# Patient Record
Sex: FEMALE | Race: WHITE
[De-identification: names, ages, dates, MRNs, and addresses within clinical notes are randomized per-mention and may not be internally consistent; named-entity substitution may affect disease eponyms.]

---

## 2018-09-03 ENCOUNTER — HOSPITAL ENCOUNTER (EMERGENCY)
Dept: HOSPITAL 95 - ER | Age: 53
LOS: 1 days | Discharge: HOME | End: 2018-09-04
Payer: MEDICAID

## 2018-09-03 VITALS — BODY MASS INDEX: 41.77 KG/M2 | HEIGHT: 62 IN | WEIGHT: 227.01 LBS

## 2018-09-03 DIAGNOSIS — S92.512A: Primary | ICD-10-CM

## 2018-09-03 DIAGNOSIS — Z87.891: ICD-10-CM
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
## 2019-06-03 ENCOUNTER — HOSPITAL ENCOUNTER (EMERGENCY)
Dept: HOSPITAL 95 - ER | Age: 54
LOS: 1 days | Discharge: HOME | End: 2019-06-04
Payer: SELF-PAY

## 2019-06-03 VITALS — BODY MASS INDEX: 41.59 KG/M2 | HEIGHT: 62 IN | WEIGHT: 226 LBS

## 2019-06-03 DIAGNOSIS — J45.909: ICD-10-CM

## 2019-06-03 DIAGNOSIS — R42: Primary | ICD-10-CM

## 2019-06-03 DIAGNOSIS — Z88.1: ICD-10-CM

## 2019-06-03 DIAGNOSIS — Z87.891: ICD-10-CM

## 2019-06-03 LAB
ALBUMIN SERPL BCP-MCNC: 4.1 G/DL (ref 3.4–5)
ALBUMIN/GLOB SERPL: 1.1 {RATIO} (ref 0.8–1.8)
ALT SERPL W P-5'-P-CCNC: 29 U/L (ref 12–78)
ANION GAP SERPL CALCULATED.4IONS-SCNC: 7 MMOL/L (ref 6–16)
AST SERPL W P-5'-P-CCNC: 14 U/L (ref 12–37)
BASOPHILS # BLD AUTO: 0.03 K/MM3 (ref 0–0.23)
BASOPHILS NFR BLD AUTO: 1 % (ref 0–2)
BILIRUB SERPL-MCNC: 0.2 MG/DL (ref 0.1–1)
BUN SERPL-MCNC: 21 MG/DL (ref 8–24)
CALCIUM SERPL-MCNC: 8.9 MG/DL (ref 8.5–10.1)
CHLORIDE SERPL-SCNC: 108 MMOL/L (ref 98–108)
CO2 SERPL-SCNC: 26 MMOL/L (ref 21–32)
CREAT SERPL-MCNC: 0.58 MG/DL (ref 0.4–1)
DEPRECATED RDW RBC AUTO: 44.8 FL (ref 35.1–46.3)
EOSINOPHIL # BLD AUTO: 0.18 K/MM3 (ref 0–0.68)
EOSINOPHIL NFR BLD AUTO: 3 % (ref 0–6)
ERYTHROCYTE [DISTWIDTH] IN BLOOD BY AUTOMATED COUNT: 13.5 % (ref 11.7–14.2)
GLOBULIN SER CALC-MCNC: 3.7 G/DL (ref 2.2–4)
GLUCOSE SERPL-MCNC: 114 MG/DL (ref 70–99)
HCT VFR BLD AUTO: 40.2 % (ref 33–51)
HGB BLD-MCNC: 13.1 G/DL (ref 11.5–16)
IMM GRANULOCYTES # BLD AUTO: 0.01 K/MM3 (ref 0–0.1)
IMM GRANULOCYTES NFR BLD AUTO: 0 % (ref 0–1)
LYMPHOCYTES # BLD AUTO: 2.55 K/MM3 (ref 0.84–5.2)
LYMPHOCYTES NFR BLD AUTO: 42 % (ref 21–46)
MCHC RBC AUTO-ENTMCNC: 32.6 G/DL (ref 31.5–36.5)
MCV RBC AUTO: 90 FL (ref 80–100)
MONOCYTES # BLD AUTO: 0.33 K/MM3 (ref 0.16–1.47)
MONOCYTES NFR BLD AUTO: 5 % (ref 4–13)
NEUTROPHILS # BLD AUTO: 3.02 K/MM3 (ref 1.96–9.15)
NEUTROPHILS NFR BLD AUTO: 49 % (ref 41–73)
NRBC # BLD AUTO: 0 K/MM3 (ref 0–0.02)
NRBC BLD AUTO-RTO: 0 /100 WBC (ref 0–0.2)
PLATELET # BLD AUTO: 266 K/MM3 (ref 150–400)
POTASSIUM SERPL-SCNC: 4.1 MMOL/L (ref 3.5–5.5)
PROT SERPL-MCNC: 7.8 G/DL (ref 6.4–8.2)
SODIUM SERPL-SCNC: 141 MMOL/L (ref 136–145)

## 2020-11-06 ENCOUNTER — HOSPITAL ENCOUNTER (OUTPATIENT)
Dept: HOSPITAL 95 - LAB SHORT | Age: 55
End: 2020-11-06
Attending: PHYSICIAN ASSISTANT
Payer: SELF-PAY

## 2020-11-06 DIAGNOSIS — R00.2: Primary | ICD-10-CM

## 2020-11-06 LAB
ALBUMIN SERPL BCP-MCNC: 4.1 G/DL (ref 3.4–5)
ALBUMIN/GLOB SERPL: 1.1 {RATIO} (ref 0.8–1.8)
ALT SERPL W P-5'-P-CCNC: 29 U/L (ref 12–78)
ANION GAP SERPL CALCULATED.4IONS-SCNC: 9 MMOL/L (ref 6–16)
AST SERPL W P-5'-P-CCNC: 12 U/L (ref 12–37)
BASOPHILS # BLD AUTO: 0.03 K/MM3 (ref 0–0.23)
BASOPHILS NFR BLD AUTO: 1 % (ref 0–2)
BILIRUB SERPL-MCNC: 0.1 MG/DL (ref 0.1–1)
BUN SERPL-MCNC: 19 MG/DL (ref 8–24)
CALCIUM SERPL-MCNC: 9.1 MG/DL (ref 8.5–10.1)
CHLORIDE SERPL-SCNC: 104 MMOL/L (ref 98–108)
CO2 SERPL-SCNC: 27 MMOL/L (ref 21–32)
CREAT SERPL-MCNC: 0.59 MG/DL (ref 0.4–1)
DEPRECATED RDW RBC AUTO: 43.7 FL (ref 35.1–46.3)
EOSINOPHIL # BLD AUTO: 0.18 K/MM3 (ref 0–0.68)
EOSINOPHIL NFR BLD AUTO: 3 % (ref 0–6)
ERYTHROCYTE [DISTWIDTH] IN BLOOD BY AUTOMATED COUNT: 13.5 % (ref 11.7–14.2)
GLOBULIN SER CALC-MCNC: 3.7 G/DL (ref 2.2–4)
GLUCOSE SERPL-MCNC: 114 MG/DL (ref 70–99)
HCT VFR BLD AUTO: 39 % (ref 33–51)
HGB BLD-MCNC: 13.1 G/DL (ref 11.5–16)
IMM GRANULOCYTES # BLD AUTO: 0.01 K/MM3 (ref 0–0.1)
IMM GRANULOCYTES NFR BLD AUTO: 0 % (ref 0–1)
LYMPHOCYTES # BLD AUTO: 2.53 K/MM3 (ref 0.84–5.2)
LYMPHOCYTES NFR BLD AUTO: 47 % (ref 21–46)
MCHC RBC AUTO-ENTMCNC: 33.6 G/DL (ref 31.5–36.5)
MCV RBC AUTO: 88 FL (ref 80–100)
MONOCYTES # BLD AUTO: 0.28 K/MM3 (ref 0.16–1.47)
MONOCYTES NFR BLD AUTO: 5 % (ref 4–13)
NEUTROPHILS # BLD AUTO: 2.4 K/MM3 (ref 1.96–9.15)
NEUTROPHILS NFR BLD AUTO: 44 % (ref 41–73)
NRBC # BLD AUTO: 0 K/MM3 (ref 0–0.02)
NRBC BLD AUTO-RTO: 0 /100 WBC (ref 0–0.2)
PLATELET # BLD AUTO: 248 K/MM3 (ref 150–400)
POTASSIUM SERPL-SCNC: 3.7 MMOL/L (ref 3.5–5.5)
PROT SERPL-MCNC: 7.8 G/DL (ref 6.4–8.2)
SODIUM SERPL-SCNC: 140 MMOL/L (ref 136–145)
TROPONIN I SERPL-MCNC: <0.017 NG/ML (ref 0–0.04)

## 2021-04-07 NOTE — NUR
Assumed care of pt at 0900 from Emperatriz BROWN. Pt A&O x 4. Answers questions.
Follows commands. Verbalizes needs. Pleasant and cooperative with care. Pt out
of bed to use bedside commode, weak, but otherwise tolerates well. Requires
one person assistance to use commode. SpO2 remains 90% or greater with rest
and mobility. Pt currently on 4 LPM NC. Does not wear O2 at home. SR per
monitor. BP stable. Bed in lowest position. Call light in reach. Pt denies
need at this time.

## 2021-04-07 NOTE — NUR
SUMMARY
 
Pt's oxygen per nasal cannula has successfully been titrated down from 4 LPM
to 3 LPM. Sp02 is currently 94%. Pt had two liquid bowel movements this shift.
Up to bedside commode for each void of urine or stool. Pt reported "lung pain"
this afternoon, worse with coughing. Fentanyl given. Pt then reported nausea
shortly after fentanyl provided. Zofran given. No vomiting noted. It was at
this time that OT came by to see pt. Plan to skip treatment today. Bed in
lowest position. Call light in reach. Will continue to closely monitor until
care handoff and bedside report with oncoming RN.

## 2021-04-07 NOTE — NUR
UPDATE
 
No acute changes. Pt currently in bed, sleeping, at this time. Pt OOB several
times to use commode. Continues to tolerate activity without drop in SpO2,
however generally weak and deconditioned.

## 2021-04-07 NOTE — NUR
SHIFT SUMMARY
PT IS ADMITTED TO ICU FOR PNE R/T COVID. PT'S DAUGHTER IS ALSO SICK AND TESTED
POSITIVE, BUT SHE STATES THAT HER SPOUSE HAS BEEN AWAY FROM HOME FOR WORK AND
HAS NOT BEEN EXPOSED. PT IS COYNE WHEN TRANSFERRING TO BEDSIDE COMMODE. SPO2 92%
OR GREATER MAINTAINED WITH O2 BY NC AT 4LPM. SHE ALSO REPORTS PAIN IN HER
LUNGS WHEN COUGHING. SHE IS DROWSY AND FALLS ASLEEP EASILY WHEN UNDISTURBED.
WILL CONTINUE TO MONITOR AND REPORT TO ONCOMING SHIFT.

## 2021-04-07 NOTE — NUR
CARE ASSUMED
PT AWAKE, ALEART TO VOISE, FOLLOWS. ON 4 L N/C, VITALS STABLE. PIV: S.L.
CONTINUE ASSESSMENT AND CARE.

## 2021-04-07 NOTE — NUR
DECREASED SAT
UP TO BSC ON 3 L N/C, PT HAS INCREASED SOB, RR 30, INCREASEING COUGHING WITH
ACTIVITY. INCREASED 02 TO 4 L N/C TO RECOVER BACK IN BED. INCREASE O2 DEMAND,
SOB COUGHING AND LUNG PAIN WITH ANY ACTIVITY.

## 2021-04-07 NOTE — NUR
ADMIT:4/6/21 DISCHARGE: DX: COVID-19 CC: [facility]
TIFFANIE CALL:
RESIDENCE: Home with spouse
CAREGIVER:
Jamie Garber, Spouse / Partner, [phone]
Daughter Windy [phone]
DX: COVID-19, vertigo
DME: none
CCM: none
HOME HEALTH: none
SUMMARY: Admit: 4/6/21
4/7/21- per chart review with Dr. Pena, pt's status has been changed from
ICU to PCU. She will not be d/c home at this time. Pt has been put on droplet
precautions. Currently on 4L O2, noted COYNE with assisted movements with
toileting, ect. PT and OT have been ordered for pt but have not been in to
work with pt at this time. -good

## 2021-04-08 NOTE — NUR
SUMMARY
 
No acute changes t/o shift. Pt OOB for majority of shift, sitting in chair. Pt
uses commode every two hours, or more frequently. Remains at 4 LPM NC. Rarely
desaturates with activity. Tolerated PT/OT well. Pt stated she has rib pain
with activity but she did not want to take pain medication. Educated that pain
meds have been changes since fentanyl made her sick. SR per monitor. BP
stable. Will continue to closely monitor until care handoff and bedside report
with oncoming RN.

## 2021-04-08 NOTE — NUR
Assumed care of pt at 0700 from Dilma BROWN. Pt A&O x 4. Answers questions.
Follows commands. Verbalizes needs. Pleasant and cooperative with care but
overall has flat affect and seems withdrawn. This AM, pt states that she needs
to have a bowel movement. Pt placed on bedpan, but after approx 5 minutes, pt
states she does not think she can have a bowel movement in a bedpan. Pt
assisted to use bedside commode. Weak, but otherwise tolerated well. Bedbath
provided while pt on commode. Pt then transferred to chair, agreeable to
sitting up for a while. No drop in SpO2 noted with activity. Pt on 4 LPM NC.
Changed to high flow and humidified NC by Sakina MEDLEY. SR per monitor. BP
stable. Pt currently in recliner with call light in reach. Pt looking forward
to working with physical therapy.

## 2021-04-08 NOTE — NUR
ASSEAAMENT
PT UP TO BSC, WITH INCREASED SOB, SATS 86%
REQUIRING 8-10 L N/C WHEN UP TO BSC. BACK TO
BED, 02 RETUREND TO 4 L N/C. SAT 95%. PAIN MEDS GIVEN PER MAR FOR ONGOING
HEADACHES.

## 2021-04-08 NOTE — NUR
ASSUMED CARE NOTE:
 
ASSUMED CARE OF PT AT 1900, RECEVIED REPORT FROM GERARDO BROWN. PT IS ALERT AND
ORIENTEDX3, ABLE TO COMMUNICATE NEEDS. PT ON 4L OF O2 VIA HUMITIFED HIGH FLOW,
SPO2 ABOVE 90% NO RESP DISTRESS NOTED AT THIS TIME. PT IS NSR WITH HR IN THE
70'S, BP STABLE. PT IS EXPERIENCING SEVERE PRURITUS AFTER DELIVERY OF TORDOL.
PT SKIN WAS WASHED, AND LOTION WAS APPLIED, COOL SHEET PROVIDED. BENYDRAL
ORDERED. NO RASH PRESENT, HOWEVER PT HAS BEEN SCRATCHING AND ABDOMEN IS
BECOMING RED, PT WAS REMINDED TO AVOID SCRATCHING IF POSSIBLE.

## 2021-04-08 NOTE — NUR
ONGOING ASSESSMENT
UP TO BSC, VERY SOB, UNABLE TO WIPE HERSELF, FEELING INCREASING WEAKNESS AND
REQUIRING INCREASED O2 TO GET UP TO BSC. DESATS 85% WITH ACTIVITY. CONSIDER
CHANGING PT TO BEDREST. CONTINUE ASSESSMENT AND CARE TILL REPORT OFF TO
ONCOMING SHIFT RN.

## 2021-04-08 NOTE — NUR
4/8/21- Per chart review, pt worked with PT today, she has a drop in O2
saturation with ambulation but it returns with rest. Pt's baseline is that she
is independent. Pt lives with family,  and 3 kids. She does provide
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 Assessment shows that pt has 14 steps to
access her bedroom but there are railings. PT's recommendation is for home
with home health and [facility] along with part-time caregiver support. Dr. Pena
has no d/c plan at this time. -jenellew

## 2021-04-08 NOTE — NUR
Patient is sitting on a chair and alert. Patient tells me about her virus
experience and how it has gone through most of her family. Patient has 6 kids.
Patient then talks at length about her spiritual journey. I normalize
patient's experience and provide recitation of scripture, pastoral  and
prayer. Patient responds well and shows signs of being encouraged in her
kurtis. I will continue to remain available to patient and family.

## 2021-04-09 NOTE — NUR
Upon patient's request to be served communion, I visit patient and bring the
communion elements. I perform the communion service with patient and provide
prayer. Patient then explains more fully the depth that communion means to
her. Patient states that she is more tired today and so I let her return to
her rest. I will continue to remain available to patient and family.

## 2021-04-09 NOTE — NUR
SHIFT SUMMARY
PT IS ALERT AND ORIENTEDx4. TODAY PT REPORTED FEELING MORE TIRED THAN PREVIOUS
DAYS. PT HAS TOLERATED SITTING UP IN CHAIR SINCE LUNCH TIME AND IS ONE PERSON
ASSITED TO CHAIR/BSC. NO CHANGES TO O2 MADE TODAY, PT HAS REMAINED ON 4L VIA
NC. SPO2 >92%, WITH OCCASSIONAL SHORT DROPS IN SPO2 WITH ACTIVITY. VITALS HAVE
REMAINED STABLE. SINUS RHYTHM ON THE MONITOR.

## 2021-04-09 NOTE — NUR
SHIFT SUMMARY:
 
NO SIGNIFICANT CHANGES T/O SHIFT. PT CONTINUES TO BE ON 4L OF 02 VIA
HUMIDIFIED OXYGEN, SPO2 ABOVE 90% BECOMES EXERTED WITH ACTIVITY, SPO2 DROPS TO
88% , PT RECOVERS QUICKLY. PT HAS BEEN BETWEEN SINUS NICCI TO SR , HR BETWEEN
55-65. BP STABLE. PT HAS BEEN USING THE BED SIDE TOILET AND COMMODE WITH ONE
PERSON MINIMAL ASSISTANCE. PT HAS BEEN ABLE TO REPOSITION SELF IN BED FROM
SIDE TO SIDE. PT HAS DENIED ANY PAIN. PRURITUS HAS RESOLVED AFTER 50MG OF
BENADRYL WAS GIVEN. WILL CONTINUE TO MONITOR PT UNTIL REPORT IS GIVEN TO
ONCOMING SHIFT.

## 2021-04-10 NOTE — NUR
CARE ASSUMED OF PT AT 0700. PT SBA TO BATHROOM; PT COUGHS AGGRESSIVELY WITH
ANY EXERTION. VERY SMALL AMT OF SPUTUM. PT WORKED W PT THIS AM AND IS NOW UP
IN CHAIR FOR BREAKFAST. PT WHISPERS AND IS SOB W ANY EXERTION INCLUDING
SPEAKING. PT HAS SOME RHONCHI TO LLL, POSSIBLY SOME FINE CRACKLES AS WELL,
CLEAR OTHERWISE. PT C/O RIB PAIN 7/10; TYLENOL GIVEN. PT MAY REQUIRE SOMETHING
MORE FOR PAIN, WILL ASK ALSO ABOUT A COUGH SUPPRESSANT. PT SATS AROUND 90-91%
ON 2L. PT AFEBRILE, PALE, SLIGHTLY DIAPHORETIC. BP TRENDING DOWN BUT STABLE.
HTN MEDS GIVEN THIS AM.

## 2021-04-10 NOTE — NUR
TRANSFER
PT TRANSFERED UP FROM ICU, PT ORIENTED TO ROOM AND CALL SYSTEM, PT UP TO THE
SHOWER AT THIS TIME

## 2021-04-10 NOTE — NUR
DR SALAZAR IN TO SEE PT. FULL UPDATE GIVEN. PT NOW MEDICAL FLOOR STATUS NO
TELE. LINES REMOVED. PT SBA TO TOILET; TOLERATED WELL.

## 2021-04-10 NOTE — NUR
SHIFT SUMMARY
 
PT RESTED WELL THROUGH NGIHT - ALERT AND ORIENTED, ABLE TO MAKE NEEDS KNOWN -
COOPERATIVE WITH PLAN OF CARE. SATS >96% ON 4LNC, RN WEANED DOWN TO 2LNC, SATS
REMAIN 95% AT THIS MOMENT. PT HAS COUGHING FIT WITH ACTIVITY, BUT SATS REMAIN
STABLE. TELE - NSR. STAND BY ASSIST TO BSC. VOIDING ADEQUATELY, NO BM. NO SKIN
ISSUES. TOLERATING DIET. NO C/O PAIN. VSS.
 
CALL LIGHT WTIHIN REACH, BED IN LOWEST POSITION. WILL CONTINUE TO MONITOR.

## 2021-04-10 NOTE — NUR
SUMMARY
 
PT SITTING UP IN BED WATCHING TV, PT HAS BEEN PLEASANT AND COOPERATIVE WITH
CARE, UP TO TAKE A SHOWER, O2 TITRATED UP TO 4L WITH ACTIVITY AND BACK DOWN TO
2L NC WHEN PT RESTED, PT COOPERATIVE WITH CARE, USES THE CALL LIGHT
APPROPRIATELY, VSS, WILL CONT TO MONITOR

## 2021-04-11 NOTE — NUR
SHIFT SUMMARY
PT IS A 56 Y/O FEMALE, ADMITTED FOR PNA R/T COVID-19 AND IN ENHANCED
ISOLATION. SHE IS A&0 X 4, INDEPENDENT IN THE ROOM. VITAL SIGNS STABLE. O2
SATS > 90% ON 2L OF O2. NO C/O ACUTE PAIN, NAUSEA OR SOB. PT SLEPT WELL
THROUGH THE NIGHT. NO ACUTE CHANGES IN PT CONDITION NOTED. WILL CONTINUE TO
MONITOR AND TREAT PER EMAR UNTIL HAND OFF TO DAY SHIFT RN.

## 2021-04-11 NOTE — NUR
SUMMARY
 
PT SITTING UP IN BED, PT HAS BEEN PLEASANT AND COOPERATIVE WITH CARE T/O THE
DAY, INDEPENDENT IN THE ROOM, PT GIVEN AN INCENTIVE SPIROMETER AND INSTRUCTED
HOW TO USE IT, PT REMAINS ON 2L NC, DYSPNEA WITH ACTIVITY, RECOVERS WELL, NO
COMPLAINTS, VSS, WILL CONT TO MONITOR

## 2021-04-12 NOTE — NUR
PT AOX4 AND COOPERATIVE OF CARE. NO DISTRESS NOTED AND PT INDEPENDENT IN ROOM.
PT PT HAD O2 TANK DROPPED OF FOR HER DISCHARGE IN ROOM. ALL PAPERWORK WAS
REVIEWED AND EDUCATIONAL MATERIAL SENT WITH PT. PT WAS ESCORTED OUT VIA WHEEL
CHAIR WITH  TO TRANSPORT.

## 2021-04-12 NOTE — NUR
SHIFT SUMMARY
PT IS A 54 Y/O FEMALE, ADMITTED FOR PNA R/T COVID, CURRENTLY IN ENHANCED
PRECAUTIONS. SHE IS A&O X 4, INDEPENDENT IN THE ROOM. VITAL SIGNS STABLE,
O2 SATS REMAINING > 90% ON 2L OF O2. PT DID REPORT MILD NAUSEA AND STOMACH
"FEELING RAW", AS WELL AS HER MOUTH AND THROAT "FEELS LIKE THEY ARE FULL OF
SAND". PT DENIED THE NEED FOR NAUSEA OR PAIN MEDICATIONS. NO C/O ACUTE SOB. NO
OTHER ACUTE CHANGES IN PT CONDITION NOTED DURING THE NIGHT. WILL CONTINUE TO
MONITOR AND TREAT PER EMAR UNTIL HAND OFF TO DAY SHIFT RN.

## 2021-04-12 NOTE — NUR
CARE COORDINATION REFERRAL - ADMIT:4/6/21        DISCHARGE: 4/12/21
DX:  COVID-19               CC: [facility]COX
TIFFANIE CALL: PT AT HOME [phone]
 
RESIDENCE: HOME WITH SPOUSE
 
CAREGIVER:
GUZMAN SMITH, SPOUSE / PARTNER, [phone]
DAUGHTER DANICA [phone]
 
DX: COVID-19, VERTIGO
 
DME: NONE
 
CCM: NONE
 
HOME HEALTH: NONE
 
SUMMARY: ADMIT: 4/6/21
4/12/21- PER CHART REVIEW WITH DR. HARRELL, PT WILL BE D/C HOME TODAY. PT HAS
NO INSURANCE AND SHE FEELS LIKE SHE WILL NEED OXYGEN AT HOME. DR. HARRELL ORDER
HOME O2 EVAL BUT ACCORDING TO THE NOTE, PT'S O2 DOES NOT DROP BELOW 89%.
CALLED [facility] AND THEY HAVE A PROGRAM THAT THE PT CAN APPLY FOR O2 AND HAVE
IT COVERED BY A NI[employer]KA SINCE HER ADMISSION IS THE RESULT OF COVID. [facility] ALSO
STATED THAT THEY WILL BRING A FINANCIAL AID APPLICATION AND PT COULD GET UP TO
70% COST COVERAGE IF THE NIURKA DOESN'T GO THROUGH. SPOKE WITH PT ABOUT HER
OPTIONS AND SHE IS WILLING TO APPLY FOR THE NIURKA AND FINANCIAL AID BUT SHE IS
ALSO WILLING TO PAY FOR IT OUT OF POCKET $140/MO. REVIEWED TIFFANIE LETTER WITH PT,
SHE ACKNOWLEDGED UNDERSTANDING AND THAT SHE WILL NEED TO MAKE HOSPITAL F/U
APPT IN 1 WEEK. . LET HER KNOW THAT [facility] WOULD BE DROPPING OFF AN O2 TANK
FOR THE PT TO TAKE HOME. ATTEMPTED TO CONTACT NURSE AND GIVE UPDATE, LMJOHNY.
-LINCOLN